# Patient Record
(demographics unavailable — no encounter records)

---

## 2025-04-24 NOTE — PHYSICAL EXAM
[de-identified] : Physical exam of the R LE  Skin intact no swelling and no ecchymosis  SILT TN SPN DPN SN  minimal active knee ROM  NVI distally  [de-identified] : 2 views R femur and 3 views R knee taken today and reviewed by me show no change in position or alignment of the R periprosthetic distal femur fracture. Prior placed total knee replacement remains stable fracture appears healed.

## 2025-04-24 NOTE — PHYSICAL EXAM
[de-identified] : Physical exam of the R LE  Skin intact no swelling and no ecchymosis  SILT TN SPN DPN SN  minimal active knee ROM  NVI distally  [de-identified] : 2 views R femur and 3 views R knee taken today and reviewed by me show no change in position or alignment of the R periprosthetic distal femur fracture. Prior placed total knee replacement remains stable fracture appears healed.

## 2025-04-24 NOTE — HISTORY OF PRESENT ILLNESS
[de-identified] : 98 yo F non ambulator sustained a R periprosthetic distal femur fracture 11/20/24. She presents today for followup.   She reports minimal pain and overall doing well.  presents today in a wheelchair

## 2025-04-24 NOTE — DISCUSSION/SUMMARY
[de-identified] : Overall she is doing well She will continue ROMAT. WE will see her back on a PRN basis as her fracture is fully healed

## 2025-04-24 NOTE — HISTORY OF PRESENT ILLNESS
[de-identified] : 96 yo F non ambulator sustained a R periprosthetic distal femur fracture 11/20/24. She presents today for followup.   She reports minimal pain and overall doing well.  presents today in a wheelchair

## 2025-04-24 NOTE — DISCUSSION/SUMMARY
[de-identified] : Overall she is doing well She will continue ROMAT. WE will see her back on a PRN basis as her fracture is fully healed